# Patient Record
Sex: MALE | Race: WHITE | NOT HISPANIC OR LATINO | Employment: UNEMPLOYED | URBAN - METROPOLITAN AREA
[De-identification: names, ages, dates, MRNs, and addresses within clinical notes are randomized per-mention and may not be internally consistent; named-entity substitution may affect disease eponyms.]

---

## 2018-02-13 ENCOUNTER — HOSPITAL ENCOUNTER (EMERGENCY)
Facility: OTHER | Age: 28
Discharge: HOME | End: 2018-02-13
Attending: EMERGENCY MEDICINE

## 2018-02-13 VITALS
DIASTOLIC BLOOD PRESSURE: 95 MMHG | HEART RATE: 86 BPM | RESPIRATION RATE: 18 BRPM | WEIGHT: 170 LBS | BODY MASS INDEX: 22.53 KG/M2 | OXYGEN SATURATION: 97 % | SYSTOLIC BLOOD PRESSURE: 135 MMHG | HEIGHT: 73 IN

## 2018-02-13 DIAGNOSIS — F19.920 INTOXICATION BY DRUG, UNCOMPLICATED: Primary | ICD-10-CM

## 2018-02-13 LAB
ANION GAP SERPL CALC-SCNC: 14 MMOL/L
BASOPHILS # BLD AUTO: 0.07 K/UL
BASOPHILS NFR BLD: 0.7 %
BUN SERPL-MCNC: 9 MG/DL
CALCIUM SERPL-MCNC: 9 MG/DL
CHLORIDE SERPL-SCNC: 105 MMOL/L
CO2 SERPL-SCNC: 22 MMOL/L
CREAT SERPL-MCNC: 0.8 MG/DL
DIFFERENTIAL METHOD: NORMAL
EOSINOPHIL # BLD AUTO: 0.3 K/UL
EOSINOPHIL NFR BLD: 2.6 %
ERYTHROCYTE [DISTWIDTH] IN BLOOD BY AUTOMATED COUNT: 13.6 %
EST. GFR  (AFRICAN AMERICAN): >60 ML/MIN/1.73 M^2
EST. GFR  (NON AFRICAN AMERICAN): >60 ML/MIN/1.73 M^2
ETHANOL SERPL-MCNC: 350 MG/DL
GLUCOSE SERPL-MCNC: 113 MG/DL
HCT VFR BLD AUTO: 45.6 %
HGB BLD-MCNC: 15 G/DL
LYMPHOCYTES # BLD AUTO: 2.9 K/UL
LYMPHOCYTES NFR BLD: 28.9 %
MCH RBC QN AUTO: 30.9 PG
MCHC RBC AUTO-ENTMCNC: 32.9 G/DL
MCV RBC AUTO: 94 FL
MONOCYTES # BLD AUTO: 0.7 K/UL
MONOCYTES NFR BLD: 6.9 %
NEUTROPHILS # BLD AUTO: 6.1 K/UL
NEUTROPHILS NFR BLD: 60.7 %
PLATELET # BLD AUTO: 209 K/UL
PMV BLD AUTO: 11.5 FL
POCT GLUCOSE: 107 MG/DL (ref 70–110)
POTASSIUM SERPL-SCNC: 3.2 MMOL/L
RBC # BLD AUTO: 4.86 M/UL
SODIUM SERPL-SCNC: 141 MMOL/L
WBC # BLD AUTO: 9.99 K/UL

## 2018-02-13 PROCEDURE — 99284 EMERGENCY DEPT VISIT MOD MDM: CPT | Mod: 25

## 2018-02-13 PROCEDURE — 63600175 PHARM REV CODE 636 W HCPCS: Performed by: EMERGENCY MEDICINE

## 2018-02-13 PROCEDURE — 80320 DRUG SCREEN QUANTALCOHOLS: CPT

## 2018-02-13 PROCEDURE — 82962 GLUCOSE BLOOD TEST: CPT

## 2018-02-13 PROCEDURE — 96374 THER/PROPH/DIAG INJ IV PUSH: CPT

## 2018-02-13 PROCEDURE — 80048 BASIC METABOLIC PNL TOTAL CA: CPT

## 2018-02-13 PROCEDURE — 85025 COMPLETE CBC W/AUTO DIFF WBC: CPT

## 2018-02-13 RX ORDER — NALOXONE HCL 0.4 MG/ML
0.4 VIAL (ML) INJECTION
Status: COMPLETED | OUTPATIENT
Start: 2018-02-13 | End: 2018-02-13

## 2018-02-13 RX ORDER — NALOXONE HCL 0.4 MG/ML
2 VIAL (ML) INJECTION
Status: COMPLETED | OUTPATIENT
Start: 2018-02-13 | End: 2018-02-13

## 2018-02-13 RX ADMIN — NALOXONE HYDROCHLORIDE 2 MG: 0.4 INJECTION, SOLUTION INTRAMUSCULAR; INTRAVENOUS; SUBCUTANEOUS at 04:02

## 2018-02-13 RX ADMIN — NALOXONE HYDROCHLORIDE 0.4 MG: 0.4 INJECTION, SOLUTION INTRAMUSCULAR; INTRAVENOUS; SUBCUTANEOUS at 03:02

## 2018-02-13 NOTE — ED NOTES
Pt asleep at current time. No s/s of pain/discomfort. Bed in low, locked position. Side rails up x 2. Call light within reach. Resp even and unlabored. Monitoring continues.

## 2018-02-13 NOTE — ED NOTES
Pt brought in by EMS unresponsive to verbal stimuli, responds to painful stimuli by picking up his head. Pt does not open his eyes, verbalize or moan. Pt does not localize. Pt soaked in urine. No respiratory distress, pt is guarding his own airway and sating b/t 95% - 100% on RA. No nasal flaring and no use of accessory muscles. Respirations are even and unlabored. Skin is cool, dry and pink. VSS. Pupils are equal, sluggish and at 6cm. NO obvious trauma noted. BBS- CTA. Abd- SNT. Pulse and sensation x 4 exts. Pt placed on stretcher in the semi- fowlers position. Bed is locked and in the low position w/ the side rails up and locked for pt safety. Pt is in direct view of the RN station. Will continue to monitor closely.

## 2018-02-13 NOTE — ED NOTES
"Pt attempted to get out of bed stating, "I gotta pee". Pt was placed back on the stretcher and asst'd w/ a urinal w/ no results. Pt then curled up in the fetal position on his L side. No respiratory distress noted. Respirations are even and unlabored. VSS. Pain assessed and bathroom needs addressed. Will continue to monitor closely.  "

## 2018-02-13 NOTE — ED NOTES
Pt in the L sided fetal position resting w/ his eyes closed, no respiratory distress. Pt is easily arousable, but tries to get out of bed when awoken. Respirations are even and unlabored. NO nasal flaring and no use of accessory muscles. Skin is warm, dry and pink. VSS. Pt is covered w/ a blanket for warmth. Pt is in direct view of RN station. Will continue to monitor closely.

## 2018-02-13 NOTE — ED PROVIDER NOTES
Encounter Date: 2/13/2018    SCRIBE #1 NOTE: ISaurabh, am scribing for, and in the presence of, Dr. Hutchins.       History     Chief Complaint   Patient presents with    Alcohol Intoxication     3:53 AM    Patient is a 27 y.o. male who presents to the ED via EMS with complaint of alcohol intoxication. Per EMS, patient was found down and unresponsive, with odors of alcohol and marijuana on him. Unsure of any other drug use.      The history is provided by the EMS personnel. The history is limited by the condition of the patient.     Review of patient's allergies indicates:   Allergen Reactions    Ibuprofen Hives     Past Medical History:   Diagnosis Date    Anxiety      No past surgical history on file.  No family history on file.  Social History   Substance Use Topics    Smoking status: Current Every Day Smoker     Packs/day: 0.50    Smokeless tobacco: Not on file    Alcohol use Yes      Comment: occasional     Review of Systems   Unable to perform ROS: Patient unresponsive (secondary to intoxication)       Physical Exam     Initial Vitals [02/13/18 0332]   BP Pulse Resp Temp SpO2   125/87 72 16 -- 99 %      MAP       99.67         Physical Exam    Nursing note and vitals reviewed.  Constitutional: He appears well-developed and well-nourished.   HENT:   Head: Normocephalic and atraumatic.   Eyes: Conjunctivae are normal.   Pupils are sluggish.   Neck: Neck supple.   Cardiovascular: Normal rate, regular rhythm and normal heart sounds.   Pulmonary/Chest: Breath sounds normal. No respiratory distress.   Musculoskeletal: Normal range of motion.   Moves all four extremities.   Neurological:   Responsive only to noxious stimuli.    Skin:   No lacerations or lesions.   Psychiatric:   Does not follow commands.         ED Course   Procedures  Labs Reviewed   BASIC METABOLIC PANEL - Abnormal; Notable for the following:        Result Value    Potassium 3.2 (*)     CO2 22 (*)     Glucose 113 (*)     All other  components within normal limits   CBC W/ AUTO DIFFERENTIAL   ALCOHOL,MEDICAL (ETHANOL)   POCT GLUCOSE               Medical Decision Making:   Clinical Tests:   Lab Tests: Reviewed and Ordered  ED Management:  Patient found sleeping on the street by EMS.  Smells of marijuana and alcohol.  EMS did find a bag of vegetable-like material in his pocket concerning for marijuana.  Patient responds to noxious stimuli but nothing else.  No signs of trauma.  No response to Narcan.  Never any respiratory depression.  Satting 100% on room air without any adjuncts.  Checking alcohol level.  Is elevated at a point to explain his intoxication, I do not think further workup indicated.  However it is not significant elevated, will proceed to CAT scan.    MODESTA Hutchins M.D.  02/13/2018  6:29 AM              Scribe Attestation:   Scribe #1: I performed the above scribed service and the documentation accurately describes the services I performed. I attest to the accuracy of the note.    Attending Attestation:           Physician Attestation for Scribe:  Physician Attestation Statement for Scribe #1: I, Dr. Hutchins, reviewed documentation, as scribed by Saurabh Gurrola in my presence, and it is both accurate and complete.                 ED Course as of Feb 13 0641   Tue Feb 13, 2018   0641 Alcohol, Medical, Serum: (!!) 350 [BL]      ED Course User Index  [BL] Mark Hutchins MD     Clinical Impression:     1. Intoxication by drug, uncomplicated                             Mark Hutchins MD  02/13/18 0630       Mark Hutchins MD  02/13/18 0641

## 2018-02-13 NOTE — ED NOTES
Received report from NERY Calhoun. Pt asleep at current time. Resp even and unlabored. No s/s of pain/discomfort or distress.

## 2018-02-13 NOTE — ED NOTES
Pt stood with assist x 1 to urinate in urinal. Pt with nystagmus and unsteady gait. Pt updated on plan of care.  Assisted pt with repositioning. Personal items are within reach. Bed in low, locked position. Side rails up x 2. Call light within reach. Pt denies any concerns/complaints. No s/s of distress.  Monitoring continues

## 2018-02-13 NOTE — PROVIDER PROGRESS NOTES - EMERGENCY DEPT.
Encounter Date: 2/13/2018    ED Physician Progress Notes        Physician Note:   Pt signed out by Dr. Hutchins. Pt presented with ETOH intoxication.  Now awake, ambulatory with steady gait, able to converse and clinically sober.  I feel that pt is stable for discharge and management as an outpatient and no further intervention is needed at this time.  Pt is comfortable returning to the ED if needed.  Will DC home in stable condition.

## 2021-06-11 NOTE — ED NOTES
Pt has no response to the 2mg of Narcan, no movement, no moaning and no change in LOC. Dr. Hutchins notified.    No